# Patient Record
Sex: MALE | URBAN - METROPOLITAN AREA
[De-identification: names, ages, dates, MRNs, and addresses within clinical notes are randomized per-mention and may not be internally consistent; named-entity substitution may affect disease eponyms.]

---

## 2020-03-07 ENCOUNTER — OFFICE VISIT (OUTPATIENT)
Dept: URBAN - METROPOLITAN AREA CLINIC 22 | Facility: CLINIC | Age: 25
End: 2020-03-07
Payer: COMMERCIAL

## 2020-03-07 DIAGNOSIS — H52.223 REGULAR ASTIGMATISM, BILATERAL: Primary | ICD-10-CM

## 2020-03-07 PROCEDURE — 92310 CONTACT LENS FITTING OU: CPT | Performed by: OPTOMETRIST

## 2020-03-07 PROCEDURE — 92015 DETERMINE REFRACTIVE STATE: CPT | Performed by: OPTOMETRIST

## 2020-03-07 PROCEDURE — 92014 COMPRE OPH EXAM EST PT 1/>: CPT | Performed by: OPTOMETRIST

## 2020-03-07 ASSESSMENT — KERATOMETRY
OD: 45.23
OS: 45.88

## 2020-03-07 ASSESSMENT — INTRAOCULAR PRESSURE
OD: 17
OS: 19

## 2020-03-07 ASSESSMENT — VISUAL ACUITY
OS: 20/20
OD: 20/20

## 2020-03-07 NOTE — IMPRESSION/PLAN
Impression: Regular astigmatism, bilateral: H52.223. Plan: Fit in new contacts today. Ok to order. New glasses prescription given to patient today for optimal vision.